# Patient Record
Sex: MALE | Race: ASIAN | NOT HISPANIC OR LATINO | Employment: UNEMPLOYED | URBAN - METROPOLITAN AREA
[De-identification: names, ages, dates, MRNs, and addresses within clinical notes are randomized per-mention and may not be internally consistent; named-entity substitution may affect disease eponyms.]

---

## 2021-08-27 ENCOUNTER — TELEPHONE (OUTPATIENT)
Dept: ADDICTION MEDICINE | Facility: HOSPITAL | Age: 21
End: 2021-08-27

## 2021-08-27 NOTE — TELEPHONE ENCOUNTER
This writer got a phone call from a client said that need help for to do CD  assessment that required from his .  A client don't have health insurance and his father will help him to apply insurance.     Mark Mo, Bilingual Community Health Liaison  .

## 2021-08-30 ENCOUNTER — DOCUMENTATION ONLY (OUTPATIENT)
Dept: ADDICTION MEDICINE | Facility: HOSPITAL | Age: 21
End: 2021-08-30

## 2021-08-30 NOTE — PROGRESS NOTES
This writer spoke to a client father on the phone regarding schedule appointment for a client to sign release for his father and . Father said that will help a client for a ride. Scheduled on 8/31/2021 at 3pm.     Mark Mo, Bilingual Community Health Liaison  8/30/2021  2:44 PM

## 2021-08-31 ENCOUNTER — DOCUMENTATION ONLY (OUTPATIENT)
Dept: ADDICTION MEDICINE | Facility: HOSPITAL | Age: 21
End: 2021-08-31

## 2021-08-31 NOTE — PROGRESS NOTES
A client came to [facility] signed release for his father and .     Mark Mo, Bilingual Community Health Liaison  8/31/2021  3:45 PM

## 2021-09-02 ENCOUNTER — DOCUMENTATION ONLY (OUTPATIENT)
Dept: ADDICTION MEDICINE | Facility: HOSPITAL | Age: 21
End: 2021-09-02

## 2021-09-02 NOTE — PROGRESS NOTES
This writer spoke to  A client father on the phone said that a client was not return home last night and not able to come to sign [facility] [employer] external authorization/consent for the release of information.Need to reschedule for next time.     Mark Mo, Bilingual Community Health Liaison  9/2/2021  9:10 AM

## 2021-12-16 ENCOUNTER — TELEPHONE (OUTPATIENT)
Dept: ADDICTION MEDICINE | Facility: HOSPITAL | Age: 21
End: 2021-12-16

## 2021-12-16 NOTE — TELEPHONE ENCOUNTER
This writer did follow up call and spoke to a client father said that Magdalena Kelly go outside with his friends most of the time. He just return home sometime when he hungry .   Sometime he go outside during night with his friends and using drugs. He is not ready to do assessment yet.     Mark Mo- Jaycee   12/16/2021  8:59 AM

## 2021-12-30 ENCOUNTER — DOCUMENTATION ONLY (OUTPATIENT)
Dept: ADDICTION MEDICINE | Facility: HOSPITAL | Age: 21
End: 2021-12-30

## 2021-12-30 NOTE — PROGRESS NOTES
This writer spoke to a client on the phone regarding schedule assessment . A client said that now ready to assessment. If he don't do assessment he have to go to nursing home. A client said that order form  . We will schedule assessment when addiction counselor is available.     Mark Matt  12/30/2021  10:58 AM

## 2024-06-16 ENCOUNTER — HOSPITAL ENCOUNTER (EMERGENCY)
Facility: HOSPITAL | Age: 24
Discharge: HOME | End: 2024-06-17
Admitting: STUDENT IN AN ORGANIZED HEALTH CARE EDUCATION/TRAINING PROGRAM
Payer: COMMERCIAL

## 2024-06-16 VITALS
HEART RATE: 96 BPM | TEMPERATURE: 97.8 F | DIASTOLIC BLOOD PRESSURE: 87 MMHG | HEIGHT: 66 IN | WEIGHT: 156 LBS | RESPIRATION RATE: 18 BRPM | OXYGEN SATURATION: 99 % | BODY MASS INDEX: 25.07 KG/M2 | SYSTOLIC BLOOD PRESSURE: 137 MMHG

## 2024-06-16 DIAGNOSIS — S01.312A LACERATION OF LEFT EARLOBE, INITIAL ENCOUNTER: ICD-10-CM

## 2024-06-16 PROCEDURE — 99283 EMERGENCY DEPT VISIT LOW MDM: CPT | Mod: 25

## 2024-06-16 PROCEDURE — 12011 RPR F/E/E/N/L/M 2.5 CM/<: CPT

## 2024-06-16 RX ORDER — METHYLCELLULOSE 4000CPS 30 %
POWDER (GRAM) MISCELLANEOUS ONCE
Status: DISCONTINUED | OUTPATIENT
Start: 2024-06-17 | End: 2024-06-17

## 2024-06-16 ASSESSMENT — ACTIVITIES OF DAILY LIVING (ADL): ADLS_ACUITY_SCORE: 33

## 2024-06-16 ASSESSMENT — COLUMBIA-SUICIDE SEVERITY RATING SCALE - C-SSRS
6. HAVE YOU EVER DONE ANYTHING, STARTED TO DO ANYTHING, OR PREPARED TO DO ANYTHING TO END YOUR LIFE?: NO
2. HAVE YOU ACTUALLY HAD ANY THOUGHTS OF KILLING YOURSELF IN THE PAST MONTH?: NO
1. IN THE PAST MONTH, HAVE YOU WISHED YOU WERE DEAD OR WISHED YOU COULD GO TO SLEEP AND NOT WAKE UP?: NO

## 2024-06-17 PROCEDURE — 250N000011 HC RX IP 250 OP 636: Mod: JZ | Performed by: STUDENT IN AN ORGANIZED HEALTH CARE EDUCATION/TRAINING PROGRAM

## 2024-06-17 PROCEDURE — 90715 TDAP VACCINE 7 YRS/> IM: CPT | Mod: JZ | Performed by: STUDENT IN AN ORGANIZED HEALTH CARE EDUCATION/TRAINING PROGRAM

## 2024-06-17 PROCEDURE — 250N000009 HC RX 250: Performed by: STUDENT IN AN ORGANIZED HEALTH CARE EDUCATION/TRAINING PROGRAM

## 2024-06-17 PROCEDURE — 12011 RPR F/E/E/N/L/M 2.5 CM/<: CPT

## 2024-06-17 PROCEDURE — 90471 IMMUNIZATION ADMIN: CPT | Performed by: STUDENT IN AN ORGANIZED HEALTH CARE EDUCATION/TRAINING PROGRAM

## 2024-06-17 RX ORDER — GINSENG 100 MG
CAPSULE ORAL ONCE
Status: COMPLETED | OUTPATIENT
Start: 2024-06-17 | End: 2024-06-17

## 2024-06-17 RX ADMIN — CLOSTRIDIUM TETANI TOXOID ANTIGEN (FORMALDEHYDE INACTIVATED), CORYNEBACTERIUM DIPHTHERIAE TOXOID ANTIGEN (FORMALDEHYDE INACTIVATED), BORDETELLA PERTUSSIS TOXOID ANTIGEN (GLUTARALDEHYDE INACTIVATED), BORDETELLA PERTUSSIS FILAMENTOUS HEMAGGLUTININ ANTIGEN (FORMALDEHYDE INACTIVATED), BORDETELLA PERTUSSIS PERTACTIN ANTIGEN, AND BORDETELLA PERTUSSIS FIMBRIAE 2/3 ANTIGEN 0.5 ML: 5; 2; 2.5; 5; 3; 5 INJECTION, SUSPENSION INTRAMUSCULAR at 00:06

## 2024-06-17 RX ADMIN — BACITRACIN: 500 OINTMENT TOPICAL at 00:40

## 2024-06-17 NOTE — ED NOTES
Pt was wrestling around and left ear gauge was pulled out, bleeding stopped at this time.  Pt unsure if he has had a tetanus vaccine.

## 2024-06-17 NOTE — ED PROVIDER NOTES
"Emergency Department Encounter   NAME: Magdalena Kelly ; AGE: 23 year old male ; YOB: 2000 ; MRN: [id number] ; PCP: Omega Yang   ED PROVIDER: Lani Guillen PA-C    Evaluation Date & Time:   6/16/2024 10:42 PM    CHIEF COMPLAINT:  Ear Laceration        Impression and Plan   FINAL IMPRESSION:    ICD-10-CM    1. Laceration of left earlobe, initial encounter  S01.312A           MDM:  Magdalena is a 23-year-old female with PMH of LINDSAY presenting to the emergency department for evaluation of left earlobe injury.  He states he was \"wrestling around\" with a friend when something caught on the large gauge in his left ear, causing the gauge to pull through his ear completely.  This happened about 20 minutes prior to arrival.  He endorses a small amount of bleeding initially, this has now stopped.  He is not on any blood thinners.  He denies any pain.  He placed a Band-Aid over the area prior to arrival.  Last tetanus vaccine unknown.    Vitals reviewed and unremarkable. On exam he is resting comfortably. The bottom of the earlobe appears to have a full thickness laceration,  the earlobe into two long pieces (see photo in physical exam section).  No active bleeding.  No evidence of foreign bodies.  No evidence of injury to the internal ear canal and no other lacerations.  His tetanus vaccine was updated today.  I performed an auricular block with lidocaine and adequate anesthesia was achieved.  The area was cleaned with normal saline.  I used 3 nonabsorbable 5-0 sutures to bring wound edges back together and complete the Elem.  Patient tolerated this well.  Bacitracin ointment and a nonadherent dressing were placed overlying.  I recommended patient have the sutures cut out in 10 to 14 days.  I also educated patient that it is possible wound edges may not adhere and he may have permanent deformity of his ear.  I did offer plastic surgery follow-up, patient declined.  If the repair is successful, I have " instructed patient to not wear any earrings for at least 6 weeks following. I recommended Tylenol and ibuprofen as needed to help with pain management.  I also recommended icing to help with any swelling.  Will watch closely for signs and symptoms of infection and return to the emergency department if any of these develop.    History:  Supplemental history from: Documented in chart  External Record(s) reviewed: Documented in chart    Work Up:  Chart documentation includes differential considered and any EKGs or imaging independently interpreted by provider, where specified.  In additional to work up documented, I considered the following work up: Documented in chart, if applicable.    External consultation:  Discussion of management with another provider: Documented in chart, if applicable    Complicating factors:  Care impacted by chronic illness: N/A  Care affected by social determinants of health: Alcohol Abuse and/or Recreational Drug Use    Disposition considerations: Discharge. No recommendations on prescription strength medication(s). See documentation for any additional details.      ED COURSE:  11:04 PM I met and introduced myself to the patient. I gathered initial history and performed my physical exam. We discussed plan for initial workup.   12:10 PM I repaired the laceration. We discussed results, discharge, follow up, and reasons to return to the ED.     At the conclusion of the encounter I discussed the results of all the tests and the disposition. The questions were answered. The patient or family acknowledged understanding and was agreeable with the care plan.      MEDICATIONS GIVEN IN THE EMERGENCY DEPARTMENT:  Medications   bacitracin ointment (has no administration in time range)   Tdap (tetanus-diphtheria-acell pertussis) (ADACEL) injection 0.5 mL (0.5 mLs Intramuscular $Given 6/17/24 0006)         NEW PRESCRIPTIONS STARTED AT TODAY'S ED VISIT:  New Prescriptions    No medications on file  "        HPI   Patient information was obtained from: Patient   Use of Intrepreter: N/A     Magdalena Kelly is a 23 year old male with no pertinent history who presents to the ED by walk in for evaluation of ear laceration.     Patient reports that he was wrestling when his left ear gauge got stuck on the other person and ripped through his ear. Bleeding is well controlled. Is not on blood thinners. Did not take any medications prior to arrival. Denies any other complaints at this time.       Medical History     History reviewed. No pertinent past medical history.    History reviewed. No pertinent surgical history.    Family History   Problem Relation Age of Onset    Diabetes No family hx of     Coronary Artery Disease No family hx of     Hypertension No family hx of     Hyperlipidemia No family hx of     Cerebrovascular Disease No family hx of     Breast Cancer No family hx of     Colon Cancer No family hx of     Prostate Cancer No family hx of     Other Cancer No family hx of     Depression No family hx of     Anxiety Disorder No family hx of     Mental Illness No family hx of     Substance Abuse No family hx of     Anesthesia Reaction No family hx of     Asthma No family hx of     Osteoporosis No family hx of     Genetic Disorder No family hx of     Thyroid Disease No family hx of     Obesity No family hx of     Unknown/Adopted No family hx of        Social History     Tobacco Use    Smoking status: Some Days     Current packs/day: 0.25     Types: Cigarettes    Tobacco comments:     smokes sometimes per pt.   Substance Use Topics    Alcohol use: No     Alcohol/week: 0.0 standard drinks of alcohol    Drug use: No       No current outpatient medications on file.        Physical Exam     First Vitals:  Patient Vitals for the past 24 hrs:   BP Temp Temp src Pulse Resp SpO2 Height Weight   06/16/24 2230 137/87 97.8  F (36.6  C) Temporal 96 18 99 % 1.676 m (5' 6\") 70.8 kg (156 lb)         PHYSICAL EXAM    General Appearance:  " Alert, cooperative, no distress, appears stated age  HENT: Normocephalic without obvious deformity. Mucous membranes moist. There is a full-thickness laceration of the bottom of the left earlobe, resulting in 2 long pieces of earlobe.  Actively oozing a very small amount of blood.  No other trauma to the ear or face.  Eyes: Conjunctiva clear, Lids normal. No discharge.   Integument: Warm, dry, no rashes or lesions  Neurologic: Alert and orientated x3. No focal deficits.  Psych: Normal mood and affect        Results     LAB:  All pertinent labs reviewed and interpreted  Labs Ordered and Resulted from Time of ED Arrival to Time of ED Departure - No data to display    RADIOLOGY:  No orders to display       PROCEDURES:  PROCEDURE: Laceration Repair   INDICATIONS: Laceration   PROCEDURE PROVIDER: Lani Guillen PA-C   SITE: Left ear   TYPE/SIZE: simple, complex, clean, and no foreign body visualized   FUNCTIONAL ASSESSMENT: Distal sensation and circulation intact   MEDICATION: 5 mLs of 1% Lidocaine without epinephrine for auricular block   PREPARATION: scrubbing with Normal saline   DEBRIDEMENT: wound explored, no foreign body found   CLOSURE:  Superficial layer closed with 3 stitches of 5-0 Ethilon simple interrupted    Total number of sutures/staples placed: 3            I, Kody Quijano, am serving as a scribe to document services personally performed by Lani Guillen PA-C, based on my observation and the provider's statements to me. I, Lani Guillen PA-C attest that Kody Quijano is acting in a scribe capacity, has observed my performance of the services and has documented them in accordance with my direction.       Lani Guillen PA-C   Emergency Medicine   [facility] EMERGENCY DEPARTMENT       Lani Guillen PA-C  06/17/24 0056

## 2024-06-17 NOTE — DISCHARGE INSTRUCTIONS
You were seen in the emergency department today for earlobe laceration.  Your tetanus vaccine was updated today.  Your earlobe was repaired with 3 nonabsorbable sutures. Leave these in for about 10-14 days and then they will need to be removed. You can go to urgent care to have these removed.  Watch for signs of infection such as significant swelling with redness, pus drainage, or fevers and return to the ER if any of these develop.    Avoid repierce in the ER or replacing your earring gauge for at least 6 weeks.

## 2024-06-17 NOTE — ED TRIAGE NOTES
"Patient reports that he was \"wrestling around\" caught left ear on \"something\"- laceration to left ear.  Bleeding controlled with band aid PTA.        "

## 2024-06-23 ENCOUNTER — HOSPITAL ENCOUNTER (EMERGENCY)
Facility: HOSPITAL | Age: 24
Discharge: HOME | End: 2024-06-23
Attending: EMERGENCY MEDICINE | Admitting: EMERGENCY MEDICINE
Payer: COMMERCIAL

## 2024-06-23 VITALS
DIASTOLIC BLOOD PRESSURE: 75 MMHG | TEMPERATURE: 98.3 F | OXYGEN SATURATION: 99 % | RESPIRATION RATE: 16 BRPM | SYSTOLIC BLOOD PRESSURE: 115 MMHG | BODY MASS INDEX: 25.18 KG/M2 | HEART RATE: 70 BPM | WEIGHT: 156 LBS

## 2024-06-23 DIAGNOSIS — S01.312A: ICD-10-CM

## 2024-06-23 PROCEDURE — 99283 EMERGENCY DEPT VISIT LOW MDM: CPT

## 2024-06-23 ASSESSMENT — COLUMBIA-SUICIDE SEVERITY RATING SCALE - C-SSRS
2. HAVE YOU ACTUALLY HAD ANY THOUGHTS OF KILLING YOURSELF IN THE PAST MONTH?: NO
6. HAVE YOU EVER DONE ANYTHING, STARTED TO DO ANYTHING, OR PREPARED TO DO ANYTHING TO END YOUR LIFE?: NO
1. IN THE PAST MONTH, HAVE YOU WISHED YOU WERE DEAD OR WISHED YOU COULD GO TO SLEEP AND NOT WAKE UP?: NO

## 2024-06-23 ASSESSMENT — ACTIVITIES OF DAILY LIVING (ADL): ADLS_ACUITY_SCORE: 33

## 2024-06-23 NOTE — ED NOTES
Torn left earlobe piercing was previously sutured. Sutures are not holding the cartilage together. Per Dr. Serrato, sutures to be removed prior to discharge.

## 2024-06-23 NOTE — ED PROVIDER NOTES
EMERGENCY DEPARTMENT ENCOUNTER      NAME: Magdalena Kelly  AGE: 23 year old male  YOB: 2000  MRN: [id number]  EVALUATION DATE & TIME: 6/23/2024 12:57 PM    PCP: Omega Yang    ED PROVIDER: Debby Serrato MD    Chief Complaint   Patient presents with    Wound Check         FINAL IMPRESSION:  1. Torn ear lobe, left, initial encounter          ED COURSE & MEDICAL DECISION MAKING:    Pertinent Labs & Imaging studies reviewed. (See chart for details)  23 year old male with history of methamphetamine abuse who presents to the Emergency Department for evaluation of left earlobe problem after he lacerated the left earlobe back on 6/16, and it was repaired.  Patient notes that it seems to be coming apart.  It indeed looks like the laceration repair has not taken and the suture material is hanging loosely.  The ends of the earlobe have already closed off and are well-healing and at this point would require a secondary repair with either ENT or plastic surgery.  Patient informed of same and given outpatient referrals for both.           Medical Decision Making    History:  Supplemental history from: Documented in chart  External Record(s) reviewed: Documented in chart    Work Up:  Chart documentation includes differential considered and any EKGs or imaging independently interpreted by provider, see MDM  In additional to work up documented, I considered the following work up: see MDM    External consultation:  Discussion of management with another provider: N/A    Complicating factors:  Care impacted by chronic illness: Substance abuse  Care affected by social determinants of health: Access to Medical Care and Alcohol Abuse and/or Recreational Drug Use: Weekend visit    Disposition considerations: Discharge. No recommendations on prescription strength medication(s). See documentation for any additional details.        At the conclusion of the encounter I discussed the results of all of the tests and the disposition.  The questions were answered. The patient or family acknowledged understanding and was agreeable with the care plan.      MEDICATIONS GIVEN IN THE EMERGENCY:  Medications - No data to display    NEW PRESCRIPTIONS STARTED AT TODAY'S ER VISIT  There are no discharge medications for this patient.         =================================================================    HPI    Patient information was obtained from: patient    Use of Intrepreter: N/A      Magdalena Kelly is a 23 year old male with pertinent medical history of methamphetamine abuse who presents for a wound check.    Per Chart Review:  6/16/24 [place]'s: Patient's left ear gauge tore through his ear. Vitals unremarkable. Earlobe was  into two long pieces due to a full thickness laceration. Received a Tdap. Auricular block with lidocaine. Three non-absorbable 5-0 sutures were placed to bring the wound back together. Bacitracin and nonadherent dressing placed. Instructed to have the sutures cut out in 10-14 days.     PAST MEDICAL HISTORY:  No past medical history on file.    PAST SURGICAL HISTORY:  No past surgical history on file.    CURRENT MEDICATIONS:    None       ALLERGIES:  No Known Allergies    FAMILY HISTORY:  Family History   Problem Relation Age of Onset    Diabetes No family hx of     Coronary Artery Disease No family hx of     Hypertension No family hx of     Hyperlipidemia No family hx of     Cerebrovascular Disease No family hx of     Breast Cancer No family hx of     Colon Cancer No family hx of     Prostate Cancer No family hx of     Other Cancer No family hx of     Depression No family hx of     Anxiety Disorder No family hx of     Mental Illness No family hx of     Substance Abuse No family hx of     Anesthesia Reaction No family hx of     Asthma No family hx of     Osteoporosis No family hx of     Genetic Disorder No family hx of     Thyroid Disease No family hx of     Obesity No family hx of     Unknown/Adopted No family hx of         SOCIAL HISTORY:  Social History     Tobacco Use    Smoking status: Some Days     Current packs/day: 0.25     Types: Cigarettes    Tobacco comments:     smokes sometimes per pt.   Substance Use Topics    Alcohol use: No     Alcohol/week: 0.0 standard drinks of alcohol    Drug use: No        VITALS:  Patient Vitals for the past 24 hrs:   BP Temp Temp src Pulse Resp SpO2 Weight   06/23/24 1332 115/75 -- -- 70 16 99 % --   06/23/24 1251 123/79 98.3  F (36.8  C) Oral 78 16 99 % 70.8 kg (156 lb)       PHYSICAL EXAM    General Appearance: Well-appearing, well-nourished, no acute distress, sitting upright in chair  Head:  Normocephalic  ENT:  Left earlobe had a previous laceration repair that did not take. Ends of lobe are healed but not together with hanging sutural material.  Cardio:  Regular rate and rhythm  Pulm:  No respiratory distress  Extremities: Normal gait  Skin:  Skin warm, dry, no rashes  Neuro:  Alert and oriented ×3    RADIOLOGY/LABS:  Reviewed all pertinent imaging. Please see official radiology report. All pertinent labs reviewed and interpreted.         The creation of this record is based on the scribe s observations of the work being performed by Debby Serrato MD and the provider s statements to them. It was created on her behalf by Angela Mon, a trained medical scribe. This document has been checked and approved by the attending provider.    Debby Serrato MD  Emergency Medicine  [facility] EMERGENCY DEPARTMENT  [facility] [facility] [id number] [phone]  Dept: [phone]     Debby Serrato MD  06/23/24 6255

## 2024-06-23 NOTE — DISCHARGE INSTRUCTIONS
The previous laceration repair was not effective and the ends of the cartilage of your earlobe have healed over closed but are not conjoined.  This will need repair, either with ENT or plastic surgery.  I have provided outpatient referrals for same and they should be calling to schedule outpatient follow-up appointment.

## 2024-06-23 NOTE — ED TRIAGE NOTES
Patient presents here for evaluation of repaired wound to his left ear lobe. Initially, he injured it 7 days ago when an earring was pulled out, causing a laceration to the lobe. The stitches appear to have become dislodged .      Triage Assessment (Adult)       Row Name 06/23/24 1252          Triage Assessment    Airway WDL WDL        Respiratory WDL    Respiratory WDL WDL        Skin Circulation/Temperature WDL    Skin Circulation/Temperature WDL WDL        Cardiac WDL    Cardiac WDL WDL        Peripheral/Neurovascular WDL    Peripheral Neurovascular WDL WDL        Cognitive/Neuro/Behavioral WDL    Cognitive/Neuro/Behavioral WDL WDL

## 2024-06-23 NOTE — ED NOTES
Bed: [place] [address]  Expected date:   Expected time:   Means of arrival:   Comments:  Ambulatory ED patient